# Patient Record
Sex: MALE | Race: WHITE | NOT HISPANIC OR LATINO | URBAN - METROPOLITAN AREA
[De-identification: names, ages, dates, MRNs, and addresses within clinical notes are randomized per-mention and may not be internally consistent; named-entity substitution may affect disease eponyms.]

---
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
## 2023-03-27 ENCOUNTER — TELEPHONE (OUTPATIENT)
Dept: ORTHOPEDIC SURGERY | Age: 18
End: 2023-03-27

## 2023-05-02 ENCOUNTER — OFFICE VISIT (OUTPATIENT)
Dept: ORTHOPEDIC SURGERY | Age: 18
End: 2023-05-02

## 2023-05-02 DIAGNOSIS — S72.302P: Primary | ICD-10-CM

## 2023-05-02 DIAGNOSIS — S72.8X1A OTHER FRACTURE OF RIGHT FEMUR, INITIAL ENCOUNTER FOR CLOSED FRACTURE (HCC): ICD-10-CM

## 2023-05-02 NOTE — PROGRESS NOTES
Progress Note    Patient: Trish Latham MRN: [id number]  SSN: [id number]    YOB: 2005  Age: 19 y.o. Sex: male        5/2/2023      Subjective:     Patient is about 6 weeks out from retrograde intramedullary nail fixation of a segmental right femoral shaft fracture. This was done in [place] at [facility].  He seems to be doing remarkably well. Objective: There were no vitals filed for this visit. Physical Exam:     Skin - incision is well healed with no redness or drainage  Motor and sensory function intact in RIGHT LOWER extremity  Pulses palpable in RIGHT LOWER extremity     XRAY FINDINGS:  [id number] right femoral shaft fracture, findings-AP and lateral views of the right femur shows that the femoral shaft fracture is very well aligned on both the AP and lateral projection. The hardware is intact with no evidence of loosening or failure. There does appear to be some early evidence of healing at the primary fracture lines impression-healing well aligned right femoral shaft fracture    Assessment:     Healing right femoral shaft fracture    Plan:     I think the patient can essentially be activity as tolerated. We can get him set up with some outpatient therapy for full active and passive range of motion of his right knee as well as full strengthening. I will see him back in about 6 weeks with AP and lateral right femur on return hopefully that will be his last visit.   He will be about 3 months out at time    Signed By: Michelle Key MD     May 2, 2023

## 2024-02-26 NOTE — TELEPHONE ENCOUNTER
Addended by: MELLISSA SKINNER on: 2/26/2024 12:23 PM     Modules accepted: Orders     Spoke with mom. Patient had surgery with Dr. Dg Tavarez at [facility] in [place] after a fall from 25 ft. Ladder. They live in [place] and want to find ortho trauma closer for follow up. Pt is to return to to [facility] for 2 wk postop and then may be scheduled with Dr. Lex Segovia.
